# Patient Record
Sex: FEMALE | Race: WHITE
[De-identification: names, ages, dates, MRNs, and addresses within clinical notes are randomized per-mention and may not be internally consistent; named-entity substitution may affect disease eponyms.]

---

## 2019-07-25 ENCOUNTER — HOSPITAL ENCOUNTER (OUTPATIENT)
Dept: HOSPITAL 25 - OR | Age: 69
Discharge: HOME | End: 2019-07-25
Attending: INTERNAL MEDICINE
Payer: MEDICARE

## 2019-07-25 VITALS — SYSTOLIC BLOOD PRESSURE: 116 MMHG | DIASTOLIC BLOOD PRESSURE: 63 MMHG

## 2019-07-25 DIAGNOSIS — R19.4: ICD-10-CM

## 2019-07-25 DIAGNOSIS — R13.14: ICD-10-CM

## 2019-07-25 DIAGNOSIS — Z90.3: ICD-10-CM

## 2019-07-25 DIAGNOSIS — K21.0: Primary | ICD-10-CM

## 2019-07-25 DIAGNOSIS — R14.0: ICD-10-CM

## 2019-07-25 DIAGNOSIS — K22.8: ICD-10-CM

## 2019-07-25 DIAGNOSIS — K57.30: ICD-10-CM

## 2019-07-25 DIAGNOSIS — R10.13: ICD-10-CM

## 2019-07-25 PROCEDURE — 88342 IMHCHEM/IMCYTCHM 1ST ANTB: CPT

## 2019-07-25 PROCEDURE — 88312 SPECIAL STAINS GROUP 1: CPT

## 2019-07-25 PROCEDURE — 88305 TISSUE EXAM BY PATHOLOGIST: CPT

## 2019-07-25 NOTE — PRO
ESOPHAGOGASTRODUODENOSCOPY AND COLONOSCOPY REPORT:

 

DATE OF PROCEDURE:  07/25/19 - [facility]

 

INDICATIONS FOR PROCEDURE:  Change in bowel habits, dysphagia.

 

PROCEDURES PERFORMED:

1.  Complete esophagogastroduodenoscopy with biopsies.

2.  Complete colonoscopy to the cecum with biopsies.

 

MEDICATIONS GIVEN:  Please see anesthesia record.

 

DESCRIPTION OF PROCEDURE:  After the EGD and colonoscopy procedure including 
the risks, benefits, and alternatives with the risks not limited to perforation
, surgery, missed lesions, and/or death were explained to the patient, written 
informed consent was obtained.  IV medication was given and a bite block was 
placed between the teeth.  The adult Olympus gastroscope was then inserted into 
the patient's oropharynx into the tubular esophagus.  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 
presbyesophagus, but was otherwise grossly normal in appearance.  Biopsies at 
the GE junction were taken to rule out microscopic inflammation.  Then entered 
the gastric pouch, which was grossly normal in appearance to about 5 to 6 cm.  
The gastrojejunal junction was intact without any zan ulceration.  A biopsy 
was taken of this to rule out H. pylori.  The scope was advanced deep into the 
jejunum without any evidence of abnormalities for the portion visualized.  The 
scope was then removed from the patient.  She tolerated the procedure well.  
She was then given additional IV sedation medication.  A rectal exam was then 
performed.  The rectal exam was unremarkable.  The adult Olympus colonoscope 
was then inserted into the patient's rectum and advanced very carefully through 
the entirety of the colon into the cecal base.  Cecal base was carefully 
inspected and normal in appearance. The preparation was good.  A photograph was 
taken at the cecal cap and the terminal ileal valve was identified and normal 
in appearance.  Over the next 7 minutes, the scope was carefully withdrawn, 
inspecting the mucosa.  There was moderate diverticulosis coli throughout the 
colon, with the greatest concentration on the left.  No other abnormalities 
were seen.  On return to the rectum, direct views were normal.  On retroflexion
, grade 1 internal hemorrhoids were appreciated. Scope was then removed from 
the patient.  She tolerated the procedure well.  She returned to the recovery 
room in stable condition.

 

IMPRESSION:

1.  Complete colonoscopy to the cecum.

2.  Biopsy taken to rule out microscopic colitis.

3.  Diverticulosis coli, moderate, throughout the colon.

4.  Complete esophagogastroduodenoscopy with biopsies.

5.  Normal Vikas-en-Y anatomy.  No evidence of anastomotic ulceration.  Biopsies 
as above.

 

RECOMMENDATIONS:  Her lower GI symptoms had resolved with the addition of 
fiber. 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  This may be 
the etiology.  If ongoing difficulties, can consider esophageal manometry.

 

 [id number] #: [id number]

PENNIE

## 2020-02-26 ENCOUNTER — HOSPITAL ENCOUNTER (EMERGENCY)
Dept: HOSPITAL 25 - ED | Age: 70
Discharge: HOME | End: 2020-02-26
Payer: MEDICARE

## 2020-02-26 VITALS — SYSTOLIC BLOOD PRESSURE: 136 MMHG | DIASTOLIC BLOOD PRESSURE: 80 MMHG

## 2020-02-26 DIAGNOSIS — Z91.048: ICD-10-CM

## 2020-02-26 DIAGNOSIS — R00.0: ICD-10-CM

## 2020-02-26 DIAGNOSIS — R07.89: ICD-10-CM

## 2020-02-26 DIAGNOSIS — R51: ICD-10-CM

## 2020-02-26 DIAGNOSIS — Z99.81: ICD-10-CM

## 2020-02-26 DIAGNOSIS — R06.02: ICD-10-CM

## 2020-02-26 DIAGNOSIS — G52.2: ICD-10-CM

## 2020-02-26 DIAGNOSIS — J10.1: Primary | ICD-10-CM

## 2020-02-26 DIAGNOSIS — K50.90: ICD-10-CM

## 2020-02-26 LAB
ALBUMIN SERPL BCG-MCNC: 4.4 G/DL (ref 3.2–5.2)
ALBUMIN/GLOB SERPL: 1.4 {RATIO} (ref 1–3)
ALP SERPL-CCNC: 55 U/L (ref 34–104)
ALT SERPL W P-5'-P-CCNC: 11 U/L (ref 7–52)
ANION GAP SERPL CALC-SCNC: 9 MMOL/L (ref 2–11)
AST SERPL-CCNC: 23 U/L (ref 13–39)
BASOPHILS # BLD AUTO: 0 10^3/UL (ref 0–0.2)
BUN SERPL-MCNC: 7 MG/DL (ref 6–24)
BUN/CREAT SERPL: 9.5 (ref 8–20)
CALCIUM SERPL-MCNC: 9.5 MG/DL (ref 8.6–10.3)
CHLORIDE SERPL-SCNC: 101 MMOL/L (ref 101–111)
CK SERPL-CCNC: 91 U/L (ref 10–223)
EOSINOPHIL # BLD AUTO: 0 10^3/UL (ref 0–0.6)
FLUAV RNA SPEC QL NAA+PROBE: POSITIVE
GLOBULIN SER CALC-MCNC: 3.2 G/DL (ref 2–4)
GLUCOSE SERPL-MCNC: 137 MG/DL (ref 70–100)
HCO3 SERPL-SCNC: 27 MMOL/L (ref 22–32)
HCT VFR BLD AUTO: 33 % (ref 35–47)
HGB BLD-MCNC: 10.9 G/DL (ref 12–16)
LYMPHOCYTES # BLD AUTO: 0.9 10^3/UL (ref 1–4.8)
MCH RBC QN AUTO: 29 PG (ref 27–31)
MCHC RBC AUTO-ENTMCNC: 33 G/DL (ref 31–36)
MCV RBC AUTO: 88 FL (ref 80–97)
MONOCYTES # BLD AUTO: 0.7 10^3/UL (ref 0–0.8)
NEUTROPHILS # BLD AUTO: 4.8 10^3/UL (ref 1.5–7.7)
NRBC # BLD AUTO: 0 10^3/UL
NRBC BLD QL AUTO: 0.1
PLATELET # BLD AUTO: 242 10^3/UL (ref 150–450)
POTASSIUM SERPL-SCNC: 4.2 MMOL/L (ref 3.5–5)
PROT SERPL-MCNC: 7.6 G/DL (ref 6.4–8.9)
RBC # BLD AUTO: 3.74 10^6 /UL (ref 3.7–4.87)
SODIUM SERPL-SCNC: 137 MMOL/L (ref 135–145)
TROPONIN I SERPL-MCNC: 0 NG/ML (ref ?–0.03)
WBC # BLD AUTO: 6.5 10^3/UL (ref 3.5–10.8)

## 2020-02-26 PROCEDURE — 99282 EMERGENCY DEPT VISIT SF MDM: CPT

## 2020-02-26 PROCEDURE — 96374 THER/PROPH/DIAG INJ IV PUSH: CPT

## 2020-02-26 PROCEDURE — 96361 HYDRATE IV INFUSION ADD-ON: CPT

## 2020-02-26 PROCEDURE — 83880 ASSAY OF NATRIURETIC PEPTIDE: CPT

## 2020-02-26 PROCEDURE — 71046 X-RAY EXAM CHEST 2 VIEWS: CPT

## 2020-02-26 PROCEDURE — 86140 C-REACTIVE PROTEIN: CPT

## 2020-02-26 PROCEDURE — 84484 ASSAY OF TROPONIN QUANT: CPT

## 2020-02-26 PROCEDURE — 80053 COMPREHEN METABOLIC PANEL: CPT

## 2020-02-26 PROCEDURE — 82550 ASSAY OF CK (CPK): CPT

## 2020-02-26 PROCEDURE — 93005 ELECTROCARDIOGRAM TRACING: CPT

## 2020-02-26 PROCEDURE — 36415 COLL VENOUS BLD VENIPUNCTURE: CPT

## 2020-02-26 PROCEDURE — 83605 ASSAY OF LACTIC ACID: CPT

## 2020-02-26 PROCEDURE — 85025 COMPLETE CBC W/AUTO DIFF WBC: CPT

## 2020-02-26 NOTE — ED
Respiratory





- HPI Summary


HPI Summary: 





This patient is a 70 y/o female presenting to [facility] for low oxygen saturation. 

Patient states she has been experiencing cough, nasal congestion, and chest 

pain secondary to cough for almost 1 week now. Patient notes she currently has 

a headche. Patient notes she went to see her PCP, Dr. Montaño, this morning and 

states her oxygen saturation was low. Patient is on 2L of chronic oxygen at 

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 Patient denies sore throat, runny nose. 


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 Denies hx of CHF.


Patient states she did have the flu shot this year. 


Denies tobacco, drug, and alcohol use.





- History of Current Complaint


Chief Complaint: EDShortnessOfBreath


Stated Complaint: LOW OXYGEN/SICK PER PT


Time Seen by Provider: 02/26/20 16:39


Hx Obtained From: Patient


Onset/Duration: Lasting Days, Still Present


Timing: Constant


Current Severity: Moderate


Pain Intensity: 5


Character: Cough (Nonproductive)


Sputum Amount: None


Aggravating Factor(s): Nothing


Alleviating Factor(s): Nothing


Associated Signs and Symptoms: Chest Pain with Cough, Nasal Congestion





- Allergy/Home Medications


Allergies/Adverse Reactions: 


 Allergies











Allergy/AdvReac Type Severity Reaction Status Date / Time


 


Adhesive Tape Allergy Mild Blisters Verified 02/26/20 14:13











Home Medications: 


 Home Medications





Zolpidem TAB* [Ambien*] 10 mg PO BEDTIME PRN 05/06/13 [History Confirmed 01/08/ 20]


Cyanocobalamin TAB* [Vitamin B12 TAB*] 5,500 mcg PO QAM 05/07/13 [History 

Confirmed 01/08/20]


HydroCODONE/Acetamin 10/325 NF [Norco 10/325 (NF)] 1 tab PO Q4HR PRN MDD 6 TABS 

05/07/13 [History Confirmed 01/08/20]


Zoledronic/Mannitol 5 MG/100ML [Reclast 5 MG/100ML] 5 mg .SEE ORDER SEE 

INSTRUCTIONS 07/06/16 [History Confirmed 01/08/20]


Gabapentin 400 mg PO .IN AM 07/17/19 [History Confirmed 01/08/20]


Gabapentin 800 mg PO .IN PM 07/17/19 [History Confirmed 01/08/20]


Multivitamin [Multiple Vitamins] 1 tab PO QAM 07/17/19 [History Confirmed 01/08/ 20]


Psyllium NEELA* [Metamucil NEELA*] 1 pkt PO QAM 07/17/19 [History Confirmed 01/08/20

]


clonazePAM TAB(*) [Klonopin TAB(*)] 0.5 mg PO BID PRN 07/17/19 [History 

Confirmed 01/08/20]


Aspirin 81 mg CHEW TAB* 81 mg PO BEDTIME 01/08/20 [History Confirmed 01/08/20]











PMH/Surg Hx/FS Hx/Imm Hx


Endocrine/Hematology History: Reports: Hx Anemia - ON REPLACEMENT


   Denies: Hx Diabetes


Cardiovascular History: Reports: Hx Hypercholesterolemia, Hx Rheumatic Fever - 

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


   Denies: Hx Aneurysm, Hx Angina, Hx Congenital Heart Disease, Hx Congestive 

Heart Failure, Hx Deep Vein Thrombosis, Hx Hypertension, Hx Pacemaker/ICD, 

Other Cardiovascular Problems/Disorders


Respiratory History: Reports: Other Respiratory Problems/Disorders - ON 02 SINE 

6/1/2001 AT ALL TIMES- INJURED VAGUS NERVE


   Denies: Hx Asthma, Hx Chronic Obstructive Pulmonary Disease (COPD)


GI History: Reports: Hx Crohn's Disease - dysphagia, Hx Irritable Bowel - IN 

THE PAST, Hx Ulcer - HX DIVERTICULITIS, Other GI Disorders - GASTRECTOMY- ONLY 

TAKES CHEWABLE AND LIQUID MEDICATION


 History: 


   Denies: Hx Chronic Renal Failure, Hx Dialysis, Hx Renal Disease


Musculoskeletal History: Reports: Hx Arthritis - IN HIPS, Hx Bursitis - 

SHOULDERS, Hx Osteoporosis, Other Musculoskeletal History - LEFT LEG SCIATICA


   Denies: Hx Rheumatoid Arthritis


Sensory History: Reports: Hx Contacts or Glasses - GLASSES


   Denies: Hx Hearing Aid


Opthamlomology History: Reports: Hx Contacts or Glasses - GLASSES


Neurological History: Reports: Other Neuro Impairments/Disorders - injured 

vagus nerve O2 AT 2 L AT ALL TIMES, HX GASTRECTOMY


Psychiatric History: 


   Denies: Hx Panic Disorder





- Cancer History


Hx Chemotherapy: No


Hx Radiation Therapy: No





- Surgical History


Surgical History: Yes


Surgery Procedure, Year, and Place: gastric pacemaker, 2000, [facility]- REMOVED WHEN SHE HAD GASTRECTOMY IN 2006- CLEARED VIA KUB X-RAY 09/03/ 14 BY DR CURRIE.  HYSTERECTOMY, 1988, [facility].  LEFT FOOT 1990S.  TONSILECTOMY 

AS A CHILD.  ANAL SPHINCTEROTOMY.  FEEDING TUBE REMOVED AND GASTRECTOMY- [facility]-

2006-[facility].  04/2013, [place] [facility] [place] FINGER 

JUNE/JULY 2014.  CYST REMOVED FROM HEAD 2015


Hx Anesthesia Reactions: No


Infectious Disease History: No


Infectious Disease History: 


   Denies: Traveled Outside the [place] in Last 30 Days





- Family History


Family History: ischemic heart disease





- Social History


Alcohol Use: None


Substance Use Type: Reports: None


Smoking Status (MU): Never Smoked Tobacco





Review of Systems


Positive: Fever


Negative: Sore Throat, Nasal Discharge


Positive: Chest Pain


Positive: Shortness Of Breath, Cough


Positive: Headache


All Other Systems Reviewed And Are Negative: Yes





Physical Exam





- Summary


Physical Exam Summary: 





VITAL SIGNS: Reviewed.


GENERAL: Patient is a well-developed and nourished female who is lying 

comfortable in the stretcher. Patient is not in any acute respiratory distress.


HEAD AND FACE: No signs of trauma. No ecchymosis, hematomas or skull 

depressions. Nasal congestion.


EYES: PERRLA, EOMI x 2, No injected conjunctiva, no nystagmus.


EARS: Hearing grossly intact. Ear canals and tympanic membranes are within 

normal limits.


MOUTH: Oropharynx within normal limits.


NECK: Supple, trachea is midline, no adenopathy, no JVD, no carotid bruit, no c-

spine tenderness, neck with full ROM.


CHEST: Symmetric, no tenderness at palpation


LUNGS: Mild decreased breath sounds.


CVS: Regular rate and rhythm, S1 and S2 present, no murmurs or gallops 

appreciated.


ABDOMEN: Soft, non-tender. No signs of distention. No rebound no guarding, and 

no masses palpated. Bowel sounds are normal.


EXTREMITIES: FROM in all major joints, no edema, no cyanosis or clubbing.


NEURO: Alert and oriented x 3. No acute neurological deficits. Speech is normal 

and follows commands.


SKIN: Dry and warm


Triage Information Reviewed: Yes


Vital Signs On Initial Exam: 


 Initial Vitals











Temp Pulse Resp BP Pulse Ox


 


 99.7 F   120   18   135/95   97 


 


 02/26/20 14:09 02/26/20 14:09  02/26/20 14:09 02/26/20 14:09 02/26/20 14:09











Vital Signs Reviewed: Yes





Procedures





- Sedation


Patient Received Moderate/Deep Sedation with Procedure: No





Diagnostics





- Vital Signs


 Vital Signs











  Temp Pulse Resp BP Pulse Ox


 


 02/26/20 16:03  99.0 F  112  16  154/100  95


 


 02/26/20 14:09  99.7 F  120  18  135/95  97














- Laboratory


Result Diagrams: 


 02/26/20 17:07





 02/26/20 17:07


Lab Statement: Any lab studies that have been ordered have been reviewed, and 

results considered in the medical decision making process.





- Radiology


  ** Chest XR


Radiology Interpretation Completed By: ED Physician


Summary of Radiographic Findings: Negative chest x-ray.





- EKG


  ** 1608


Cardiac Rate: Tachycardia - at 101 bpm.


EKG Rhythm: Sinus Tachycardia


Summary of EKG Findings: EKG at 1608 shows sinus tachycardia at a rate of 101 

bpm. No ST elevations. This EKG was interpreted and reviewed by ED physician.





Re-Evaluation





- Re-Evaluation


  ** First Eval


Re-Evaluation Time: 18:09


Comment: Reviewed results with patient. She will be discharged home with follow 

up from her PCP.





Disposition





- Course


Assessment/Plan: This patient is a 70 y/o female presenting to [facility] for low 

oxygen saturation. Patient states she has been experiencing cough, nasal 

congestion, and chest pain secondary to cough for almost 1 week now. Patient 

notes she currently has a headache. Patient notes she went to see her PCP, Dr. Montaño, this morning and states her oxygen saturation was low. Patient is on 2L 

of chronic oxygen at home secondary to vagus nerve injury. Patient denies a 

sore throat, runny nose.  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 Denies hx of 

CHF.  Patient states she did have the flu shot this year.  Denies tobacco, drug

, and alcohol use.  Blood work without any significant abnormality except for 

slight anemia, glucose 137, CRP of 29.8.  Influenza A is positive, influenza B 

is negative.  Chest x-ray impression: Negative.  The patients symptoms have 

been present for more than 3 days therefore she will not benefit from Tamiflu.  

In the ED course the patient was given IV fluids, Toradol and Norco and her 

symptoms improved.  I discussed all the findings and test results with the 

patient. Patient was instructed to return to the emergency room immediately if 

any of the symptoms return or worsen. Plan of care was discussed with the 

patient and she understands and agrees. All questions were answered at patient 

satisfaction.  There were no further complaints or concerns.  Lung exam before 

discharge: CTA B/L. Good air exchange. No wheezing or crackles heard. CVS: S1 

and S2 present. No murmurs appreciated. Patient is alert and oriented x 3. 

Patient is hemodynamically stable. Patient will be discharged home with follow 

up from her PCP in the next 2-3 days.





- Diagnoses


Provider Diagnoses: 


 Influenza A








Discharge ED





- Sign-Out/Discharge


Documenting (check all that apply): Patient Departure - Discharge home





- Discharge Plan


Condition: Stable


Disposition: HOME


Patient Education Materials:  Influenza (ED)


Referrals: 


Zulema Montaño MD [Primary Care Provider] - 


Additional Instructions: 


FOLLOW UP WITH YOUR PRIMARY CARE PROVIDER IN 2-3 DAYS. 





RETURN TO THE EMERGENCY DEPARTMENT FOR ANY WORSENING OR NEW SYMPTOMS.





- Billing Disposition and Condition


Condition: STABLE


Disposition: Home





- Attestation Statements


Document Initiated by Scribe: Yes


Documenting Scribe: Mary Hickman


Provider For Whom Fainaibjose alberto is Documenting (Include Credential): Stuart Montalvo MD


Scribe Attestation: 


Mary VERGARA scribed for Stuart Montalvo MD on 02/28/20 at 0756. 


Scribe Documentation Reviewed: Yes


Provider Attestation: 


The documentation as recorded by the Mary carpenter accurately reflects 

the service I personally performed and the decisions made by Stuart silver MD


Status of Scribe Document: Viewed
